# Patient Record
Sex: MALE | Race: WHITE | NOT HISPANIC OR LATINO | Employment: OTHER | URBAN - METROPOLITAN AREA
[De-identification: names, ages, dates, MRNs, and addresses within clinical notes are randomized per-mention and may not be internally consistent; named-entity substitution may affect disease eponyms.]

---

## 2023-03-24 DIAGNOSIS — E11.9 TYPE 2 DIABETES MELLITUS WITHOUT COMPLICATION, WITHOUT LONG-TERM CURRENT USE OF INSULIN (MULTI): Primary | ICD-10-CM

## 2023-03-24 RX ORDER — GLIPIZIDE 10 MG/1
10 TABLET, FILM COATED, EXTENDED RELEASE ORAL DAILY
Qty: 90 TABLET | Refills: 0 | Status: SHIPPED | OUTPATIENT
Start: 2023-03-24 | End: 2023-07-19 | Stop reason: SDUPTHER

## 2023-03-24 RX ORDER — DAPAGLIFLOZIN 10 MG/1
1 TABLET, FILM COATED ORAL DAILY
COMMUNITY
Start: 2020-02-20 | End: 2023-03-24 | Stop reason: SDUPTHER

## 2023-03-24 RX ORDER — DAPAGLIFLOZIN 10 MG/1
10 TABLET, FILM COATED ORAL DAILY
Qty: 90 TABLET | Refills: 0 | Status: SHIPPED | OUTPATIENT
Start: 2023-03-24 | End: 2023-07-03 | Stop reason: SDUPTHER

## 2023-03-24 RX ORDER — PIOGLITAZONEHYDROCHLORIDE 30 MG/1
1 TABLET ORAL DAILY
COMMUNITY
Start: 2022-06-02 | End: 2023-03-24 | Stop reason: SDUPTHER

## 2023-03-24 RX ORDER — GLIPIZIDE 10 MG/1
1 TABLET, FILM COATED, EXTENDED RELEASE ORAL DAILY
COMMUNITY
Start: 2019-08-12 | End: 2023-03-24 | Stop reason: SDUPTHER

## 2023-03-24 RX ORDER — PIOGLITAZONEHYDROCHLORIDE 30 MG/1
30 TABLET ORAL DAILY
Qty: 90 TABLET | Refills: 0 | Status: SHIPPED | OUTPATIENT
Start: 2023-03-24 | End: 2023-08-03 | Stop reason: SDUPTHER

## 2023-06-06 ENCOUNTER — OFFICE VISIT (OUTPATIENT)
Dept: PRIMARY CARE | Facility: CLINIC | Age: 68
End: 2023-06-06
Payer: MEDICARE

## 2023-06-06 VITALS
HEIGHT: 69 IN | DIASTOLIC BLOOD PRESSURE: 70 MMHG | SYSTOLIC BLOOD PRESSURE: 120 MMHG | WEIGHT: 167 LBS | HEART RATE: 84 BPM | BODY MASS INDEX: 24.73 KG/M2

## 2023-06-06 DIAGNOSIS — E78.01 ESSENTIAL FAMILIAL HYPERCHOLESTEROLEMIA: ICD-10-CM

## 2023-06-06 DIAGNOSIS — I10 BENIGN ESSENTIAL HYPERTENSION: Primary | ICD-10-CM

## 2023-06-06 DIAGNOSIS — R73.9 HYPERGLYCEMIA: ICD-10-CM

## 2023-06-06 DIAGNOSIS — E11.9 DIABETES MELLITUS WITH NO COMPLICATION (MULTI): ICD-10-CM

## 2023-06-06 DIAGNOSIS — K21.9 GASTROESOPHAGEAL REFLUX DISEASE WITHOUT ESOPHAGITIS: ICD-10-CM

## 2023-06-06 PROBLEM — E55.9 VITAMIN D DEFICIENCY: Status: ACTIVE | Noted: 2023-06-06

## 2023-06-06 PROBLEM — M1A.9XX0 CHRONIC GOUT: Status: ACTIVE | Noted: 2023-06-06

## 2023-06-06 LAB
ALANINE AMINOTRANSFERASE (SGPT) (U/L) IN SER/PLAS: 26 U/L (ref 10–52)
ALBUMIN (G/DL) IN SER/PLAS: 4.4 G/DL (ref 3.4–5)
ALKALINE PHOSPHATASE (U/L) IN SER/PLAS: 81 U/L (ref 33–136)
ANION GAP IN SER/PLAS: 15 MMOL/L (ref 10–20)
ASPARTATE AMINOTRANSFERASE (SGOT) (U/L) IN SER/PLAS: 21 U/L (ref 9–39)
BILIRUBIN TOTAL (MG/DL) IN SER/PLAS: 0.6 MG/DL (ref 0–1.2)
CALCIUM (MG/DL) IN SER/PLAS: 9.6 MG/DL (ref 8.6–10.6)
CARBON DIOXIDE, TOTAL (MMOL/L) IN SER/PLAS: 27 MMOL/L (ref 21–32)
CHLORIDE (MMOL/L) IN SER/PLAS: 104 MMOL/L (ref 98–107)
CHOLESTEROL (MG/DL) IN SER/PLAS: 161 MG/DL (ref 0–199)
CHOLESTEROL IN HDL (MG/DL) IN SER/PLAS: 40 MG/DL
CHOLESTEROL/HDL RATIO: 4
CREATININE (MG/DL) IN SER/PLAS: 1.13 MG/DL (ref 0.5–1.3)
GFR MALE: 71 ML/MIN/1.73M2
GLUCOSE (MG/DL) IN SER/PLAS: 145 MG/DL (ref 74–99)
LDL: 70 MG/DL (ref 0–99)
NON HDL CHOLESTEROL: 121 MG/DL
POC HEMOGLOBIN A1C: 6 % (ref 4.2–6.5)
POTASSIUM (MMOL/L) IN SER/PLAS: 3.9 MMOL/L (ref 3.5–5.3)
PROTEIN TOTAL: 7.2 G/DL (ref 6.4–8.2)
SODIUM (MMOL/L) IN SER/PLAS: 142 MMOL/L (ref 136–145)
TRIGLYCERIDE (MG/DL) IN SER/PLAS: 254 MG/DL (ref 0–149)
UREA NITROGEN (MG/DL) IN SER/PLAS: 22 MG/DL (ref 6–23)
VLDL: 51 MG/DL (ref 0–40)

## 2023-06-06 PROCEDURE — 83036 HEMOGLOBIN GLYCOSYLATED A1C: CPT | Performed by: FAMILY MEDICINE

## 2023-06-06 PROCEDURE — 3078F DIAST BP <80 MM HG: CPT | Performed by: FAMILY MEDICINE

## 2023-06-06 PROCEDURE — 80053 COMPREHEN METABOLIC PANEL: CPT

## 2023-06-06 PROCEDURE — 1159F MED LIST DOCD IN RCRD: CPT | Performed by: FAMILY MEDICINE

## 2023-06-06 PROCEDURE — 99214 OFFICE O/P EST MOD 30 MIN: CPT | Performed by: FAMILY MEDICINE

## 2023-06-06 PROCEDURE — 1036F TOBACCO NON-USER: CPT | Performed by: FAMILY MEDICINE

## 2023-06-06 PROCEDURE — 3074F SYST BP LT 130 MM HG: CPT | Performed by: FAMILY MEDICINE

## 2023-06-06 PROCEDURE — 1160F RVW MEDS BY RX/DR IN RCRD: CPT | Performed by: FAMILY MEDICINE

## 2023-06-06 PROCEDURE — 80061 LIPID PANEL: CPT

## 2023-06-06 RX ORDER — OMEGA-3-ACID ETHYL ESTERS 1 G/1
CAPSULE, LIQUID FILLED ORAL
COMMUNITY

## 2023-06-06 RX ORDER — LANCETS
EACH MISCELLANEOUS
COMMUNITY

## 2023-06-06 RX ORDER — ROSUVASTATIN CALCIUM 20 MG/1
TABLET, COATED ORAL
COMMUNITY
End: 2023-09-20 | Stop reason: SDUPTHER

## 2023-06-06 RX ORDER — ALLOPURINOL 300 MG/1
TABLET ORAL
COMMUNITY
End: 2024-05-21 | Stop reason: SDUPTHER

## 2023-06-06 RX ORDER — BLOOD-GLUCOSE METER
EACH MISCELLANEOUS
COMMUNITY

## 2023-06-06 RX ORDER — AMLODIPINE AND BENAZEPRIL HYDROCHLORIDE 5; 20 MG/1; MG/1
CAPSULE ORAL
COMMUNITY
End: 2023-08-03 | Stop reason: SDUPTHER

## 2023-06-06 RX ORDER — BLOOD SUGAR DIAGNOSTIC
STRIP MISCELLANEOUS
COMMUNITY

## 2023-06-06 RX ORDER — HYDROCHLOROTHIAZIDE 25 MG/1
TABLET ORAL
COMMUNITY
End: 2023-07-19 | Stop reason: SDUPTHER

## 2023-06-06 ASSESSMENT — ENCOUNTER SYMPTOMS
MUSCULOSKELETAL NEGATIVE: 1
GASTROINTESTINAL NEGATIVE: 1
RESPIRATORY NEGATIVE: 1
NEUROLOGICAL NEGATIVE: 1
CONSTITUTIONAL NEGATIVE: 1
CARDIOVASCULAR NEGATIVE: 1

## 2023-06-06 NOTE — PROGRESS NOTES
"Subjective   Patient ID: Denis Gonda is a 67 y.o. male who presents for No chief complaint on file..    HPI htn stable ccc and fu, chol, dm     Review of Systems   Constitutional: Negative.    HENT: Negative.     Respiratory: Negative.     Cardiovascular: Negative.    Gastrointestinal: Negative.    Musculoskeletal: Negative.    Neurological: Negative.        Objective   /70   Pulse 84   Ht 1.74 m (5' 8.5\")   Wt 75.8 kg (167 lb)   BMI 25.02 kg/m²     Physical Exam  Vitals reviewed.   Constitutional:       Appearance: Normal appearance. He is normal weight.   Eyes:      Extraocular Movements: Extraocular movements intact.      Conjunctiva/sclera: Conjunctivae normal.      Pupils: Pupils are equal, round, and reactive to light.   Cardiovascular:      Rate and Rhythm: Normal rate and regular rhythm.      Pulses: Normal pulses.      Heart sounds: Normal heart sounds.   Pulmonary:      Effort: Pulmonary effort is normal.      Breath sounds: Normal breath sounds.   Abdominal:      General: Bowel sounds are normal.      Palpations: Abdomen is soft.   Musculoskeletal:         General: Normal range of motion.   Skin:     General: Skin is warm and dry.   Neurological:      General: No focal deficit present.      Mental Status: He is alert and oriented to person, place, and time. Mental status is at baseline.         Assessment/Plan   Problem List Items Addressed This Visit    None  Visit Diagnoses       Diabetes mellitus with no complication (CMS/HCC)        Relevant Orders    POCT glycosylated hemoglobin (Hb A1C) manually resulted               "

## 2023-06-08 ENCOUNTER — TELEPHONE (OUTPATIENT)
Dept: PRIMARY CARE | Facility: CLINIC | Age: 68
End: 2023-06-08
Payer: MEDICARE

## 2023-07-03 DIAGNOSIS — E11.9 TYPE 2 DIABETES MELLITUS WITHOUT COMPLICATION, WITHOUT LONG-TERM CURRENT USE OF INSULIN (MULTI): ICD-10-CM

## 2023-07-03 RX ORDER — DAPAGLIFLOZIN 10 MG/1
10 TABLET, FILM COATED ORAL DAILY
Qty: 90 TABLET | Refills: 0 | Status: SHIPPED | OUTPATIENT
Start: 2023-07-03 | End: 2023-10-16 | Stop reason: SDUPTHER

## 2023-07-19 DIAGNOSIS — E11.9 TYPE 2 DIABETES MELLITUS WITHOUT COMPLICATION, WITHOUT LONG-TERM CURRENT USE OF INSULIN (MULTI): ICD-10-CM

## 2023-07-19 DIAGNOSIS — I10 BENIGN ESSENTIAL HYPERTENSION: Primary | ICD-10-CM

## 2023-07-20 RX ORDER — GLIPIZIDE 10 MG/1
10 TABLET, FILM COATED, EXTENDED RELEASE ORAL DAILY
Qty: 90 TABLET | Refills: 0 | Status: SHIPPED | OUTPATIENT
Start: 2023-07-20 | End: 2023-11-15 | Stop reason: SDUPTHER

## 2023-07-20 RX ORDER — HYDROCHLOROTHIAZIDE 25 MG/1
25 TABLET ORAL DAILY
Qty: 90 TABLET | Refills: 0 | Status: SHIPPED | OUTPATIENT
Start: 2023-07-20 | End: 2024-01-31 | Stop reason: SDUPTHER

## 2023-08-03 DIAGNOSIS — E11.9 TYPE 2 DIABETES MELLITUS WITHOUT COMPLICATION, WITHOUT LONG-TERM CURRENT USE OF INSULIN (MULTI): ICD-10-CM

## 2023-08-03 DIAGNOSIS — I10 BENIGN ESSENTIAL HYPERTENSION: Primary | ICD-10-CM

## 2023-08-04 RX ORDER — AMLODIPINE AND BENAZEPRIL HYDROCHLORIDE 5; 20 MG/1; MG/1
1 CAPSULE ORAL DAILY
Qty: 90 CAPSULE | Refills: 0 | Status: SHIPPED | OUTPATIENT
Start: 2023-08-04 | End: 2023-11-13 | Stop reason: SDUPTHER

## 2023-08-04 RX ORDER — PIOGLITAZONEHYDROCHLORIDE 30 MG/1
30 TABLET ORAL DAILY
Qty: 90 TABLET | Refills: 0 | Status: SHIPPED | OUTPATIENT
Start: 2023-08-04 | End: 2023-09-20 | Stop reason: SDUPTHER

## 2023-09-20 DIAGNOSIS — E78.01 ESSENTIAL FAMILIAL HYPERCHOLESTEROLEMIA: Primary | ICD-10-CM

## 2023-09-20 DIAGNOSIS — E11.9 TYPE 2 DIABETES MELLITUS WITHOUT COMPLICATION, WITHOUT LONG-TERM CURRENT USE OF INSULIN (MULTI): ICD-10-CM

## 2023-09-20 RX ORDER — ROSUVASTATIN CALCIUM 20 MG/1
20 TABLET, COATED ORAL DAILY
Qty: 90 TABLET | Refills: 0 | Status: SHIPPED | OUTPATIENT
Start: 2023-09-20 | End: 2024-01-31 | Stop reason: SDUPTHER

## 2023-09-20 RX ORDER — PIOGLITAZONEHYDROCHLORIDE 30 MG/1
30 TABLET ORAL DAILY
Qty: 90 TABLET | Refills: 0 | Status: SHIPPED | OUTPATIENT
Start: 2023-09-20 | End: 2024-01-24 | Stop reason: SDUPTHER

## 2023-10-16 DIAGNOSIS — E11.9 TYPE 2 DIABETES MELLITUS WITHOUT COMPLICATION, WITHOUT LONG-TERM CURRENT USE OF INSULIN (MULTI): ICD-10-CM

## 2023-10-16 RX ORDER — DAPAGLIFLOZIN 10 MG/1
10 TABLET, FILM COATED ORAL DAILY
Qty: 90 TABLET | Refills: 0 | Status: SHIPPED | OUTPATIENT
Start: 2023-10-16 | End: 2023-10-18 | Stop reason: SDUPTHER

## 2023-10-18 DIAGNOSIS — E11.9 TYPE 2 DIABETES MELLITUS WITHOUT COMPLICATION, WITHOUT LONG-TERM CURRENT USE OF INSULIN (MULTI): ICD-10-CM

## 2023-10-18 RX ORDER — DAPAGLIFLOZIN 10 MG/1
10 TABLET, FILM COATED ORAL DAILY
Qty: 90 TABLET | Refills: 0 | Status: SHIPPED | OUTPATIENT
Start: 2023-10-18 | End: 2023-10-24 | Stop reason: SDUPTHER

## 2023-10-24 ENCOUNTER — TELEPHONE (OUTPATIENT)
Dept: PRIMARY CARE | Facility: CLINIC | Age: 68
End: 2023-10-24
Payer: MEDICARE

## 2023-10-24 DIAGNOSIS — E11.9 TYPE 2 DIABETES MELLITUS WITHOUT COMPLICATION, WITHOUT LONG-TERM CURRENT USE OF INSULIN (MULTI): ICD-10-CM

## 2023-10-24 RX ORDER — DAPAGLIFLOZIN 10 MG/1
10 TABLET, FILM COATED ORAL DAILY
Qty: 90 TABLET | Refills: 0 | Status: SHIPPED | OUTPATIENT
Start: 2023-10-24 | End: 2024-01-24 | Stop reason: SDUPTHER

## 2023-10-24 NOTE — TELEPHONE ENCOUNTER
approved   Initiate Treatment: Recommend Elta MD sunscreen or any broad spectrum SPF +35 daily. Recommend reapplication with sun exposure exceeding 2 hours. Detail Level: Zone

## 2023-11-13 DIAGNOSIS — I10 BENIGN ESSENTIAL HYPERTENSION: ICD-10-CM

## 2023-11-14 RX ORDER — AMLODIPINE AND BENAZEPRIL HYDROCHLORIDE 5; 20 MG/1; MG/1
1 CAPSULE ORAL DAILY
Qty: 90 CAPSULE | Refills: 0 | Status: SHIPPED | OUTPATIENT
Start: 2023-11-14 | End: 2024-03-26 | Stop reason: SDUPTHER

## 2023-11-15 DIAGNOSIS — E11.9 TYPE 2 DIABETES MELLITUS WITHOUT COMPLICATION, WITHOUT LONG-TERM CURRENT USE OF INSULIN (MULTI): ICD-10-CM

## 2023-11-16 RX ORDER — GLIPIZIDE 10 MG/1
10 TABLET, FILM COATED, EXTENDED RELEASE ORAL DAILY
Qty: 90 TABLET | Refills: 1 | Status: SHIPPED | OUTPATIENT
Start: 2023-11-16 | End: 2024-01-24 | Stop reason: SDUPTHER

## 2023-12-07 ENCOUNTER — OFFICE VISIT (OUTPATIENT)
Dept: PRIMARY CARE | Facility: CLINIC | Age: 68
End: 2023-12-07
Payer: MEDICARE

## 2023-12-07 VITALS
DIASTOLIC BLOOD PRESSURE: 79 MMHG | WEIGHT: 166 LBS | SYSTOLIC BLOOD PRESSURE: 120 MMHG | HEIGHT: 69 IN | HEART RATE: 89 BPM | BODY MASS INDEX: 24.59 KG/M2

## 2023-12-07 DIAGNOSIS — E78.01 ESSENTIAL FAMILIAL HYPERCHOLESTEROLEMIA: Primary | ICD-10-CM

## 2023-12-07 DIAGNOSIS — R35.0 BENIGN PROSTATIC HYPERPLASIA WITH URINARY FREQUENCY: ICD-10-CM

## 2023-12-07 DIAGNOSIS — N40.1 BENIGN PROSTATIC HYPERPLASIA WITH URINARY FREQUENCY: ICD-10-CM

## 2023-12-07 DIAGNOSIS — E11.9 DIABETES MELLITUS WITH NO COMPLICATION (MULTI): ICD-10-CM

## 2023-12-07 DIAGNOSIS — M1A.9XX0 CHRONIC GOUT WITHOUT TOPHUS, UNSPECIFIED CAUSE, UNSPECIFIED SITE: ICD-10-CM

## 2023-12-07 LAB
ALBUMIN SERPL BCP-MCNC: 4.7 G/DL (ref 3.4–5)
ALP SERPL-CCNC: 79 U/L (ref 33–136)
ALT SERPL W P-5'-P-CCNC: 29 U/L (ref 10–52)
ANION GAP SERPL CALC-SCNC: 16 MMOL/L (ref 10–20)
AST SERPL W P-5'-P-CCNC: 22 U/L (ref 9–39)
BILIRUB SERPL-MCNC: 0.7 MG/DL (ref 0–1.2)
BUN SERPL-MCNC: 27 MG/DL (ref 6–23)
CALCIUM SERPL-MCNC: 10 MG/DL (ref 8.6–10.6)
CHLORIDE SERPL-SCNC: 97 MMOL/L (ref 98–107)
CHOLEST SERPL-MCNC: 161 MG/DL (ref 0–199)
CHOLESTEROL/HDL RATIO: 3.6
CO2 SERPL-SCNC: 28 MMOL/L (ref 21–32)
CREAT SERPL-MCNC: 1.3 MG/DL (ref 0.5–1.3)
GFR SERPL CREATININE-BSD FRML MDRD: 60 ML/MIN/1.73M*2
GLUCOSE SERPL-MCNC: 248 MG/DL (ref 74–99)
HDLC SERPL-MCNC: 44.5 MG/DL
LDLC SERPL CALC-MCNC: 69 MG/DL
NON HDL CHOLESTEROL: 117 MG/DL (ref 0–149)
POC HEMOGLOBIN A1C: 7.7 % (ref 4.2–6.5)
POTASSIUM SERPL-SCNC: 3.9 MMOL/L (ref 3.5–5.3)
PROT SERPL-MCNC: 7.4 G/DL (ref 6.4–8.2)
PSA SERPL-MCNC: 3.03 NG/ML
SODIUM SERPL-SCNC: 137 MMOL/L (ref 136–145)
TRIGL SERPL-MCNC: 240 MG/DL (ref 0–149)
URATE SERPL-MCNC: 6.5 MG/DL (ref 4–7.5)
VLDL: 48 MG/DL (ref 0–40)

## 2023-12-07 PROCEDURE — 1036F TOBACCO NON-USER: CPT | Performed by: FAMILY MEDICINE

## 2023-12-07 PROCEDURE — 80053 COMPREHEN METABOLIC PANEL: CPT

## 2023-12-07 PROCEDURE — 3074F SYST BP LT 130 MM HG: CPT | Performed by: FAMILY MEDICINE

## 2023-12-07 PROCEDURE — 99214 OFFICE O/P EST MOD 30 MIN: CPT | Performed by: FAMILY MEDICINE

## 2023-12-07 PROCEDURE — 36415 COLL VENOUS BLD VENIPUNCTURE: CPT

## 2023-12-07 PROCEDURE — 3078F DIAST BP <80 MM HG: CPT | Performed by: FAMILY MEDICINE

## 2023-12-07 PROCEDURE — 1160F RVW MEDS BY RX/DR IN RCRD: CPT | Performed by: FAMILY MEDICINE

## 2023-12-07 PROCEDURE — 1159F MED LIST DOCD IN RCRD: CPT | Performed by: FAMILY MEDICINE

## 2023-12-07 PROCEDURE — G0439 PPPS, SUBSEQ VISIT: HCPCS | Performed by: FAMILY MEDICINE

## 2023-12-07 PROCEDURE — 83036 HEMOGLOBIN GLYCOSYLATED A1C: CPT | Performed by: FAMILY MEDICINE

## 2023-12-07 PROCEDURE — 84550 ASSAY OF BLOOD/URIC ACID: CPT

## 2023-12-07 PROCEDURE — 84153 ASSAY OF PSA TOTAL: CPT

## 2023-12-07 PROCEDURE — 80061 LIPID PANEL: CPT

## 2023-12-07 PROCEDURE — 1170F FXNL STATUS ASSESSED: CPT | Performed by: FAMILY MEDICINE

## 2023-12-07 ASSESSMENT — PATIENT HEALTH QUESTIONNAIRE - PHQ9
1. LITTLE INTEREST OR PLEASURE IN DOING THINGS: NOT AT ALL
SUM OF ALL RESPONSES TO PHQ9 QUESTIONS 1 AND 2: 0
2. FEELING DOWN, DEPRESSED OR HOPELESS: NOT AT ALL
2. FEELING DOWN, DEPRESSED OR HOPELESS: NOT AT ALL
SUM OF ALL RESPONSES TO PHQ9 QUESTIONS 1 AND 2: 0
1. LITTLE INTEREST OR PLEASURE IN DOING THINGS: NOT AT ALL

## 2023-12-07 ASSESSMENT — ENCOUNTER SYMPTOMS
GASTROINTESTINAL NEGATIVE: 1
NEUROLOGICAL NEGATIVE: 1
LOSS OF SENSATION IN FEET: 0
MUSCULOSKELETAL NEGATIVE: 1
DEPRESSION: 0
RESPIRATORY NEGATIVE: 1
CONSTITUTIONAL NEGATIVE: 1
CARDIOVASCULAR NEGATIVE: 1
OCCASIONAL FEELINGS OF UNSTEADINESS: 0

## 2023-12-07 ASSESSMENT — ACTIVITIES OF DAILY LIVING (ADL)
MANAGING_FINANCES: INDEPENDENT
TAKING_MEDICATION: INDEPENDENT
DOING_HOUSEWORK: INDEPENDENT
DRESSING: INDEPENDENT
GROCERY_SHOPPING: INDEPENDENT
BATHING: INDEPENDENT

## 2023-12-07 NOTE — PROGRESS NOTES
Subjective   Reason for Visit: Denis Gonda is an 68 y.o. male here for a Medicare Wellness visit.      Pt comes in for wellness exam. Pt has no concerns today.          HPI    Patient Care Team:  Bandar Mandel MD as PCP - General (Family Medicine)  Bandar Mandel MD as PCP - [facility] Attributed Provider     Review of Systems    Objective   Vitals:  There were no vitals taken for this visit.      Physical Exam    Assessment/Plan   Problem List Items Addressed This Visit    None

## 2023-12-07 NOTE — PROGRESS NOTES
"Subjective   Patient ID: Denis Gonda is a 68 y.o. male who presents for Medicare Annual Wellness Visit Subsequent.    HPI well check , dm , gout, chol,     Review of Systems   Constitutional: Negative.    HENT: Negative.     Respiratory: Negative.     Cardiovascular: Negative.    Gastrointestinal: Negative.    Musculoskeletal: Negative.    Neurological: Negative.        Objective   /79   Pulse 89   Ht 1.753 m (5' 9\")   Wt 75.3 kg (166 lb)   BMI 24.51 kg/m²     Physical Exam  Vitals reviewed.   Constitutional:       Appearance: Normal appearance. He is normal weight.   Eyes:      Extraocular Movements: Extraocular movements intact.      Conjunctiva/sclera: Conjunctivae normal.      Pupils: Pupils are equal, round, and reactive to light.   Cardiovascular:      Rate and Rhythm: Normal rate and regular rhythm.      Pulses: Normal pulses.      Heart sounds: Normal heart sounds.   Pulmonary:      Effort: Pulmonary effort is normal.      Breath sounds: Normal breath sounds.   Abdominal:      General: Bowel sounds are normal.      Palpations: Abdomen is soft.   Musculoskeletal:         General: Normal range of motion.   Skin:     General: Skin is warm and dry.   Neurological:      General: No focal deficit present.      Mental Status: He is alert and oriented to person, place, and time. Mental status is at baseline.       Assessment/Plan   Problem List Items Addressed This Visit             ICD-10-CM    Chronic gout M1A.9XX0    Diabetes mellitus with no complication (CMS/[facility]) E11.9    Relevant Orders    POCT glycosylated hemoglobin (Hb A1C) manually resulted    Essential familial hypercholesterolemia - Primary E78.01     Other Visit Diagnoses         Codes    Benign prostatic hyperplasia with urinary frequency     N40.1, R35.0        Dm will reduce carbs as levels is 7.7 and try dietary adjustmante and fu       "

## 2023-12-08 ENCOUNTER — TELEPHONE (OUTPATIENT)
Dept: PRIMARY CARE | Facility: CLINIC | Age: 68
End: 2023-12-08
Payer: MEDICARE

## 2023-12-08 NOTE — TELEPHONE ENCOUNTER
----- Message from Bandar Mandel MD sent at 12/8/2023  7:50 AM EST -----  Sugar elevated as we discussed all other bw is normal

## 2023-12-08 NOTE — TELEPHONE ENCOUNTER
Spoke to patient     ----- Message from Bandar Mandel MD sent at 12/8/2023  7:50 AM EST -----  Sugar elevated as we discussed all other bw is normal

## 2023-12-08 NOTE — TELEPHONE ENCOUNTER
Lm for patient to call back    ----- Message from Bandar Mandel MD sent at 12/8/2023  7:50 AM EST -----  Sugar elevated as we discussed all other bw is normal

## 2024-01-24 DIAGNOSIS — E11.9 TYPE 2 DIABETES MELLITUS WITHOUT COMPLICATION, WITHOUT LONG-TERM CURRENT USE OF INSULIN (MULTI): ICD-10-CM

## 2024-01-24 RX ORDER — PIOGLITAZONEHYDROCHLORIDE 30 MG/1
30 TABLET ORAL DAILY
Qty: 90 TABLET | Refills: 1 | Status: SHIPPED | OUTPATIENT
Start: 2024-01-24 | End: 2024-07-22

## 2024-01-24 RX ORDER — DAPAGLIFLOZIN 10 MG/1
10 TABLET, FILM COATED ORAL DAILY
Qty: 90 TABLET | Refills: 1 | Status: SHIPPED | OUTPATIENT
Start: 2024-01-24 | End: 2024-02-07 | Stop reason: SDUPTHER

## 2024-01-24 RX ORDER — GLIPIZIDE 10 MG/1
10 TABLET, FILM COATED, EXTENDED RELEASE ORAL DAILY
Qty: 90 TABLET | Refills: 1 | Status: SHIPPED | OUTPATIENT
Start: 2024-01-24 | End: 2024-03-26 | Stop reason: SDUPTHER

## 2024-01-31 DIAGNOSIS — I10 BENIGN ESSENTIAL HYPERTENSION: ICD-10-CM

## 2024-01-31 DIAGNOSIS — E78.01 ESSENTIAL FAMILIAL HYPERCHOLESTEROLEMIA: ICD-10-CM

## 2024-01-31 RX ORDER — ROSUVASTATIN CALCIUM 20 MG/1
20 TABLET, COATED ORAL DAILY
Qty: 90 TABLET | Refills: 1 | Status: SHIPPED | OUTPATIENT
Start: 2024-01-31

## 2024-01-31 RX ORDER — HYDROCHLOROTHIAZIDE 25 MG/1
25 TABLET ORAL DAILY
Qty: 90 TABLET | Refills: 1 | Status: SHIPPED | OUTPATIENT
Start: 2024-01-31 | End: 2024-05-21 | Stop reason: SDUPTHER

## 2024-02-07 ENCOUNTER — TELEPHONE (OUTPATIENT)
Dept: PRIMARY CARE | Facility: CLINIC | Age: 69
End: 2024-02-07
Payer: MEDICARE

## 2024-02-07 DIAGNOSIS — E11.9 TYPE 2 DIABETES MELLITUS WITHOUT COMPLICATION, WITHOUT LONG-TERM CURRENT USE OF INSULIN (MULTI): ICD-10-CM

## 2024-02-07 RX ORDER — DAPAGLIFLOZIN 10 MG/1
10 TABLET, FILM COATED ORAL DAILY
Qty: 30 TABLET | Refills: 6 | Status: SHIPPED | OUTPATIENT
Start: 2024-02-07 | End: 2024-02-28 | Stop reason: SDUPTHER

## 2024-02-07 NOTE — TELEPHONE ENCOUNTER
I received a letter stating pt can not get 90 pills for 90 days of farxiga plan only allows 30 pills per 30 days at once - can you please let pt know and resent rx over to [facility] thanks

## 2024-02-28 DIAGNOSIS — E11.9 TYPE 2 DIABETES MELLITUS WITHOUT COMPLICATION, WITHOUT LONG-TERM CURRENT USE OF INSULIN (MULTI): ICD-10-CM

## 2024-02-28 RX ORDER — DAPAGLIFLOZIN 10 MG/1
10 TABLET, FILM COATED ORAL DAILY
Qty: 30 TABLET | Refills: 6 | Status: SHIPPED | OUTPATIENT
Start: 2024-02-28 | End: 2024-03-26 | Stop reason: SDUPTHER

## 2024-03-26 ENCOUNTER — APPOINTMENT (OUTPATIENT)
Dept: PRIMARY CARE | Facility: CLINIC | Age: 69
End: 2024-03-26
Payer: MEDICARE

## 2024-03-26 DIAGNOSIS — I10 BENIGN ESSENTIAL HYPERTENSION: ICD-10-CM

## 2024-03-26 DIAGNOSIS — E11.9 TYPE 2 DIABETES MELLITUS WITHOUT COMPLICATION, WITHOUT LONG-TERM CURRENT USE OF INSULIN (MULTI): ICD-10-CM

## 2024-03-26 RX ORDER — DAPAGLIFLOZIN 10 MG/1
10 TABLET, FILM COATED ORAL DAILY
Qty: 30 TABLET | Refills: 6 | Status: SHIPPED | OUTPATIENT
Start: 2024-03-26 | End: 2024-04-30 | Stop reason: SDUPTHER

## 2024-03-26 RX ORDER — AMLODIPINE AND BENAZEPRIL HYDROCHLORIDE 5; 20 MG/1; MG/1
1 CAPSULE ORAL DAILY
Qty: 90 CAPSULE | Refills: 1 | Status: SHIPPED | OUTPATIENT
Start: 2024-03-26 | End: 2024-09-22

## 2024-03-26 RX ORDER — GLIPIZIDE 10 MG/1
10 TABLET, FILM COATED, EXTENDED RELEASE ORAL DAILY
Qty: 90 TABLET | Refills: 1 | Status: SHIPPED | OUTPATIENT
Start: 2024-03-26 | End: 2024-09-22

## 2024-04-30 DIAGNOSIS — E11.9 TYPE 2 DIABETES MELLITUS WITHOUT COMPLICATION, WITHOUT LONG-TERM CURRENT USE OF INSULIN (MULTI): ICD-10-CM

## 2024-04-30 RX ORDER — DAPAGLIFLOZIN 10 MG/1
10 TABLET, FILM COATED ORAL DAILY
Qty: 90 TABLET | Refills: 0 | Status: SHIPPED | OUTPATIENT
Start: 2024-04-30 | End: 2024-05-30 | Stop reason: SDUPTHER

## 2024-04-30 RX ORDER — DAPAGLIFLOZIN 10 MG/1
10 TABLET, FILM COATED ORAL DAILY
Qty: 30 TABLET | Refills: 6 | Status: SHIPPED | OUTPATIENT
Start: 2024-04-30 | End: 2024-04-30 | Stop reason: SDUPTHER

## 2024-05-21 DIAGNOSIS — I10 BENIGN ESSENTIAL HYPERTENSION: ICD-10-CM

## 2024-05-21 DIAGNOSIS — M1A.9XX0 CHRONIC GOUT WITHOUT TOPHUS, UNSPECIFIED CAUSE, UNSPECIFIED SITE: Primary | ICD-10-CM

## 2024-05-21 RX ORDER — HYDROCHLOROTHIAZIDE 25 MG/1
25 TABLET ORAL DAILY
Qty: 90 TABLET | Refills: 0 | Status: SHIPPED | OUTPATIENT
Start: 2024-05-21 | End: 2024-11-17

## 2024-05-21 RX ORDER — ALLOPURINOL 300 MG/1
300 TABLET ORAL DAILY
Qty: 90 TABLET | Refills: 0 | Status: SHIPPED | OUTPATIENT
Start: 2024-05-21

## 2024-05-30 ENCOUNTER — OFFICE VISIT (OUTPATIENT)
Dept: PRIMARY CARE | Facility: CLINIC | Age: 69
End: 2024-05-30
Payer: MEDICARE

## 2024-05-30 VITALS
WEIGHT: 160 LBS | SYSTOLIC BLOOD PRESSURE: 143 MMHG | BODY MASS INDEX: 24.25 KG/M2 | DIASTOLIC BLOOD PRESSURE: 79 MMHG | HEIGHT: 68 IN | HEART RATE: 79 BPM

## 2024-05-30 DIAGNOSIS — E11.9 TYPE 2 DIABETES MELLITUS WITHOUT COMPLICATION, WITHOUT LONG-TERM CURRENT USE OF INSULIN (MULTI): ICD-10-CM

## 2024-05-30 DIAGNOSIS — K21.9 GASTROESOPHAGEAL REFLUX DISEASE WITHOUT ESOPHAGITIS: ICD-10-CM

## 2024-05-30 DIAGNOSIS — I10 BENIGN ESSENTIAL HYPERTENSION: Primary | ICD-10-CM

## 2024-05-30 DIAGNOSIS — E78.01 ESSENTIAL FAMILIAL HYPERCHOLESTEROLEMIA: ICD-10-CM

## 2024-05-30 DIAGNOSIS — E11.9 DIABETES MELLITUS WITH NO COMPLICATION (MULTI): ICD-10-CM

## 2024-05-30 PROBLEM — H04.129 DRY EYE SYNDROME: Status: ACTIVE | Noted: 2023-03-31

## 2024-05-30 PROBLEM — H52.7 REFRACTION ERROR: Status: ACTIVE | Noted: 2024-04-03

## 2024-05-30 PROBLEM — H25.9 AGE-RELATED CATARACT: Status: ACTIVE | Noted: 2023-03-31

## 2024-05-30 LAB
ALBUMIN SERPL BCP-MCNC: 4.7 G/DL (ref 3.4–5)
ALP SERPL-CCNC: 71 U/L (ref 33–136)
ALT SERPL W P-5'-P-CCNC: 34 U/L (ref 10–52)
ANION GAP SERPL CALC-SCNC: 14 MMOL/L (ref 10–20)
AST SERPL W P-5'-P-CCNC: 21 U/L (ref 9–39)
BILIRUB SERPL-MCNC: 0.5 MG/DL (ref 0–1.2)
BUN SERPL-MCNC: 31 MG/DL (ref 6–23)
CALCIUM SERPL-MCNC: 9.9 MG/DL (ref 8.6–10.6)
CHLORIDE SERPL-SCNC: 105 MMOL/L (ref 98–107)
CHOLEST SERPL-MCNC: 156 MG/DL (ref 0–199)
CHOLESTEROL/HDL RATIO: 3.1
CO2 SERPL-SCNC: 27 MMOL/L (ref 21–32)
CREAT SERPL-MCNC: 1.12 MG/DL (ref 0.5–1.3)
EGFRCR SERPLBLD CKD-EPI 2021: 72 ML/MIN/1.73M*2
GLUCOSE SERPL-MCNC: 85 MG/DL (ref 74–99)
HDLC SERPL-MCNC: 50.8 MG/DL
LDLC SERPL CALC-MCNC: 75 MG/DL
NON HDL CHOLESTEROL: 105 MG/DL (ref 0–149)
POC HEMOGLOBIN A1C: 6.3 % (ref 4.2–6.5)
POTASSIUM SERPL-SCNC: 4.1 MMOL/L (ref 3.5–5.3)
PROT SERPL-MCNC: 7.2 G/DL (ref 6.4–8.2)
SODIUM SERPL-SCNC: 142 MMOL/L (ref 136–145)
TRIGL SERPL-MCNC: 152 MG/DL (ref 0–149)
VLDL: 30 MG/DL (ref 0–40)

## 2024-05-30 PROCEDURE — 1158F ADVNC CARE PLAN TLK DOCD: CPT | Performed by: FAMILY MEDICINE

## 2024-05-30 PROCEDURE — 1123F ACP DISCUSS/DSCN MKR DOCD: CPT | Performed by: FAMILY MEDICINE

## 2024-05-30 PROCEDURE — 1159F MED LIST DOCD IN RCRD: CPT | Performed by: FAMILY MEDICINE

## 2024-05-30 PROCEDURE — 3077F SYST BP >= 140 MM HG: CPT | Performed by: FAMILY MEDICINE

## 2024-05-30 PROCEDURE — 1036F TOBACCO NON-USER: CPT | Performed by: FAMILY MEDICINE

## 2024-05-30 PROCEDURE — 80053 COMPREHEN METABOLIC PANEL: CPT

## 2024-05-30 PROCEDURE — 3078F DIAST BP <80 MM HG: CPT | Performed by: FAMILY MEDICINE

## 2024-05-30 PROCEDURE — 80061 LIPID PANEL: CPT

## 2024-05-30 PROCEDURE — 99214 OFFICE O/P EST MOD 30 MIN: CPT | Performed by: FAMILY MEDICINE

## 2024-05-30 PROCEDURE — 83036 HEMOGLOBIN GLYCOSYLATED A1C: CPT | Performed by: FAMILY MEDICINE

## 2024-05-30 PROCEDURE — 36415 COLL VENOUS BLD VENIPUNCTURE: CPT

## 2024-05-30 RX ORDER — DAPAGLIFLOZIN 10 MG/1
10 TABLET, FILM COATED ORAL DAILY
Qty: 90 TABLET | Refills: 1 | Status: SHIPPED | OUTPATIENT
Start: 2024-05-30 | End: 2024-11-26

## 2024-05-30 ASSESSMENT — ENCOUNTER SYMPTOMS
MUSCULOSKELETAL NEGATIVE: 1
PSYCHIATRIC NEGATIVE: 1
LOSS OF SENSATION IN FEET: 0
GASTROINTESTINAL NEGATIVE: 1
HEMATOLOGIC/LYMPHATIC NEGATIVE: 1
ALLERGIC/IMMUNOLOGIC NEGATIVE: 1
OCCASIONAL FEELINGS OF UNSTEADINESS: 0
NEUROLOGICAL NEGATIVE: 1
DEPRESSION: 0
CONSTITUTIONAL NEGATIVE: 1
RESPIRATORY NEGATIVE: 1
ENDOCRINE NEGATIVE: 1
EYES NEGATIVE: 1
CARDIOVASCULAR NEGATIVE: 1

## 2024-05-30 NOTE — PROGRESS NOTES
"Subjective   Patient ID: Denis Gonda is a 68 y.o. male who presents for No chief complaint on file..    HPI htn, chol, dm gerd    Review of Systems   Constitutional: Negative.    HENT: Negative.     Eyes: Negative.    Respiratory: Negative.     Cardiovascular: Negative.    Gastrointestinal: Negative.    Endocrine: Negative.    Musculoskeletal: Negative.    Skin: Negative.    Allergic/Immunologic: Negative.    Neurological: Negative.    Hematological: Negative.    Psychiatric/Behavioral: Negative.         Objective   /79   Pulse 79   Ht 1.727 m (5' 8\")   Wt 72.6 kg (160 lb)   BMI 24.33 kg/m²     Physical Exam  Vitals reviewed.   Constitutional:       Appearance: Normal appearance. He is normal weight.   Eyes:      Extraocular Movements: Extraocular movements intact.      Conjunctiva/sclera: Conjunctivae normal.      Pupils: Pupils are equal, round, and reactive to light.   Cardiovascular:      Rate and Rhythm: Normal rate and regular rhythm.      Pulses: Normal pulses.      Heart sounds: Normal heart sounds.   Pulmonary:      Effort: Pulmonary effort is normal.      Breath sounds: Normal breath sounds.   Abdominal:      General: Bowel sounds are normal.      Palpations: Abdomen is soft.   Musculoskeletal:         General: Normal range of motion.   Skin:     General: Skin is warm and dry.   Neurological:      General: No focal deficit present.      Mental Status: He is alert and oriented to person, place, and time. Mental status is at baseline.       Assessment/Plan   Problem List Items Addressed This Visit             ICD-10-CM    Benign essential hypertension - Primary I10    Relevant Orders    Comprehensive Metabolic Panel    Diabetes mellitus with no complication (Multi) E11.9    Esophageal reflux K21.9    Essential familial hypercholesterolemia E78.01    Relevant Orders    Lipid Panel     Other Visit Diagnoses         Codes    Type 2 diabetes mellitus without complication, without long-term current use " of insulin (Multi)     E11.9    Relevant Medications    dapagliflozin propanediol (Farxiga) 10 mg    Other Relevant Orders    POCT glycosylated hemoglobin (Hb A1C) manually resulted (Completed)

## 2024-05-31 ENCOUNTER — TELEPHONE (OUTPATIENT)
Dept: PRIMARY CARE | Facility: CLINIC | Age: 69
End: 2024-05-31
Payer: MEDICARE

## 2024-07-01 DIAGNOSIS — E78.01 ESSENTIAL FAMILIAL HYPERCHOLESTEROLEMIA: ICD-10-CM

## 2024-07-01 RX ORDER — ROSUVASTATIN CALCIUM 20 MG/1
20 TABLET, COATED ORAL DAILY
Qty: 90 TABLET | Refills: 1 | Status: SHIPPED | OUTPATIENT
Start: 2024-07-01

## 2024-08-09 DIAGNOSIS — E11.9 TYPE 2 DIABETES MELLITUS WITHOUT COMPLICATION, WITHOUT LONG-TERM CURRENT USE OF INSULIN (MULTI): ICD-10-CM

## 2024-08-09 RX ORDER — PIOGLITAZONEHYDROCHLORIDE 30 MG/1
30 TABLET ORAL DAILY
Qty: 90 TABLET | Refills: 1 | Status: SHIPPED | OUTPATIENT
Start: 2024-08-09 | End: 2025-02-05

## 2024-08-19 DIAGNOSIS — E11.9 TYPE 2 DIABETES MELLITUS WITHOUT COMPLICATION, WITHOUT LONG-TERM CURRENT USE OF INSULIN (MULTI): ICD-10-CM

## 2024-08-19 RX ORDER — PIOGLITAZONEHYDROCHLORIDE 30 MG/1
30 TABLET ORAL DAILY
Qty: 90 TABLET | Refills: 1 | Status: SHIPPED | OUTPATIENT
Start: 2024-08-19 | End: 2025-02-15

## 2024-09-30 DIAGNOSIS — E11.9 TYPE 2 DIABETES MELLITUS WITHOUT COMPLICATION, WITHOUT LONG-TERM CURRENT USE OF INSULIN (MULTI): ICD-10-CM

## 2024-09-30 DIAGNOSIS — I10 BENIGN ESSENTIAL HYPERTENSION: ICD-10-CM

## 2024-09-30 RX ORDER — AMLODIPINE AND BENAZEPRIL HYDROCHLORIDE 5; 20 MG/1; MG/1
1 CAPSULE ORAL DAILY
Qty: 90 CAPSULE | Refills: 0 | Status: SHIPPED | OUTPATIENT
Start: 2024-09-30 | End: 2025-03-29

## 2024-09-30 RX ORDER — GLIPIZIDE 10 MG/1
10 TABLET, FILM COATED, EXTENDED RELEASE ORAL DAILY
Qty: 90 TABLET | Refills: 0 | Status: SHIPPED | OUTPATIENT
Start: 2024-09-30 | End: 2025-03-29

## 2024-10-31 DIAGNOSIS — I10 BENIGN ESSENTIAL HYPERTENSION: ICD-10-CM

## 2024-10-31 DIAGNOSIS — M1A.9XX0 CHRONIC GOUT WITHOUT TOPHUS, UNSPECIFIED CAUSE, UNSPECIFIED SITE: ICD-10-CM

## 2024-10-31 RX ORDER — AMLODIPINE AND BENAZEPRIL HYDROCHLORIDE 5; 20 MG/1; MG/1
1 CAPSULE ORAL DAILY
Qty: 90 CAPSULE | Refills: 0 | Status: SHIPPED | OUTPATIENT
Start: 2024-10-31 | End: 2025-04-29

## 2024-10-31 RX ORDER — ALLOPURINOL 300 MG/1
300 TABLET ORAL DAILY
Qty: 90 TABLET | Refills: 0 | Status: SHIPPED | OUTPATIENT
Start: 2024-10-31

## 2024-11-22 DIAGNOSIS — E11.9 TYPE 2 DIABETES MELLITUS WITHOUT COMPLICATION, WITHOUT LONG-TERM CURRENT USE OF INSULIN (MULTI): ICD-10-CM

## 2024-11-22 RX ORDER — DAPAGLIFLOZIN 10 MG/1
10 TABLET, FILM COATED ORAL DAILY
Qty: 30 TABLET | Refills: 0 | Status: SHIPPED | OUTPATIENT
Start: 2024-11-22 | End: 2024-12-22

## 2024-11-22 RX ORDER — PIOGLITAZONEHYDROCHLORIDE 30 MG/1
30 TABLET ORAL DAILY
Qty: 30 TABLET | Refills: 0 | Status: SHIPPED | OUTPATIENT
Start: 2024-11-22 | End: 2024-12-22

## 2024-12-02 ENCOUNTER — APPOINTMENT (OUTPATIENT)
Dept: PRIMARY CARE | Facility: CLINIC | Age: 69
End: 2024-12-02
Payer: MEDICARE

## 2024-12-02 VITALS
SYSTOLIC BLOOD PRESSURE: 115 MMHG | WEIGHT: 165 LBS | HEART RATE: 88 BPM | HEIGHT: 68 IN | BODY MASS INDEX: 25.01 KG/M2 | DIASTOLIC BLOOD PRESSURE: 70 MMHG

## 2024-12-02 DIAGNOSIS — I10 BENIGN ESSENTIAL HYPERTENSION: ICD-10-CM

## 2024-12-02 DIAGNOSIS — N40.1 BENIGN PROSTATIC HYPERPLASIA WITH URINARY FREQUENCY: ICD-10-CM

## 2024-12-02 DIAGNOSIS — M1A.9XX0 CHRONIC GOUT WITHOUT TOPHUS, UNSPECIFIED CAUSE, UNSPECIFIED SITE: ICD-10-CM

## 2024-12-02 DIAGNOSIS — E78.01 ESSENTIAL FAMILIAL HYPERCHOLESTEROLEMIA: Primary | ICD-10-CM

## 2024-12-02 DIAGNOSIS — E11.9 DIABETES MELLITUS WITH NO COMPLICATION (MULTI): ICD-10-CM

## 2024-12-02 DIAGNOSIS — K21.9 GASTROESOPHAGEAL REFLUX DISEASE WITHOUT ESOPHAGITIS: ICD-10-CM

## 2024-12-02 DIAGNOSIS — R35.0 BENIGN PROSTATIC HYPERPLASIA WITH URINARY FREQUENCY: ICD-10-CM

## 2024-12-02 DIAGNOSIS — E11.9 TYPE 2 DIABETES MELLITUS WITHOUT COMPLICATION, WITHOUT LONG-TERM CURRENT USE OF INSULIN (MULTI): ICD-10-CM

## 2024-12-02 LAB
ALBUMIN SERPL BCP-MCNC: 4.4 G/DL (ref 3.4–5)
ALP SERPL-CCNC: 72 U/L (ref 33–136)
ALT SERPL W P-5'-P-CCNC: 37 U/L (ref 10–52)
ANION GAP SERPL CALC-SCNC: 14 MMOL/L (ref 10–20)
AST SERPL W P-5'-P-CCNC: 20 U/L (ref 9–39)
BILIRUB SERPL-MCNC: 0.5 MG/DL (ref 0–1.2)
BUN SERPL-MCNC: 24 MG/DL (ref 6–23)
CALCIUM SERPL-MCNC: 9.7 MG/DL (ref 8.6–10.6)
CHLORIDE SERPL-SCNC: 104 MMOL/L (ref 98–107)
CHOLEST SERPL-MCNC: 148 MG/DL (ref 0–199)
CHOLESTEROL/HDL RATIO: 3
CO2 SERPL-SCNC: 26 MMOL/L (ref 21–32)
CREAT SERPL-MCNC: 1.1 MG/DL (ref 0.5–1.3)
EGFRCR SERPLBLD CKD-EPI 2021: 73 ML/MIN/1.73M*2
GLUCOSE SERPL-MCNC: 133 MG/DL (ref 74–99)
HDLC SERPL-MCNC: 49.8 MG/DL
LDLC SERPL CALC-MCNC: 63 MG/DL
NON HDL CHOLESTEROL: 98 MG/DL (ref 0–149)
POC HEMOGLOBIN A1C: 6.7 % (ref 4.2–6.5)
POTASSIUM SERPL-SCNC: 3.9 MMOL/L (ref 3.5–5.3)
PROT SERPL-MCNC: 6.8 G/DL (ref 6.4–8.2)
PSA SERPL-MCNC: 3.1 NG/ML
SODIUM SERPL-SCNC: 140 MMOL/L (ref 136–145)
TRIGL SERPL-MCNC: 174 MG/DL (ref 0–149)
URATE SERPL-MCNC: 4.2 MG/DL (ref 4–7.5)
VLDL: 35 MG/DL (ref 0–40)

## 2024-12-02 PROCEDURE — 1158F ADVNC CARE PLAN TLK DOCD: CPT | Performed by: FAMILY MEDICINE

## 2024-12-02 PROCEDURE — 84153 ASSAY OF PSA TOTAL: CPT

## 2024-12-02 PROCEDURE — 1123F ACP DISCUSS/DSCN MKR DOCD: CPT | Performed by: FAMILY MEDICINE

## 2024-12-02 PROCEDURE — 3074F SYST BP LT 130 MM HG: CPT | Performed by: FAMILY MEDICINE

## 2024-12-02 PROCEDURE — 3048F LDL-C <100 MG/DL: CPT | Performed by: FAMILY MEDICINE

## 2024-12-02 PROCEDURE — 80061 LIPID PANEL: CPT

## 2024-12-02 PROCEDURE — 99214 OFFICE O/P EST MOD 30 MIN: CPT | Performed by: FAMILY MEDICINE

## 2024-12-02 PROCEDURE — 1159F MED LIST DOCD IN RCRD: CPT | Performed by: FAMILY MEDICINE

## 2024-12-02 PROCEDURE — 1036F TOBACCO NON-USER: CPT | Performed by: FAMILY MEDICINE

## 2024-12-02 PROCEDURE — 84550 ASSAY OF BLOOD/URIC ACID: CPT

## 2024-12-02 PROCEDURE — 83036 HEMOGLOBIN GLYCOSYLATED A1C: CPT | Performed by: FAMILY MEDICINE

## 2024-12-02 PROCEDURE — 3008F BODY MASS INDEX DOCD: CPT | Performed by: FAMILY MEDICINE

## 2024-12-02 PROCEDURE — G0439 PPPS, SUBSEQ VISIT: HCPCS | Performed by: FAMILY MEDICINE

## 2024-12-02 PROCEDURE — 1170F FXNL STATUS ASSESSED: CPT | Performed by: FAMILY MEDICINE

## 2024-12-02 PROCEDURE — 80053 COMPREHEN METABOLIC PANEL: CPT

## 2024-12-02 PROCEDURE — 3078F DIAST BP <80 MM HG: CPT | Performed by: FAMILY MEDICINE

## 2024-12-02 ASSESSMENT — ACTIVITIES OF DAILY LIVING (ADL)
DOING_HOUSEWORK: INDEPENDENT
MANAGING_FINANCES: INDEPENDENT
TAKING_MEDICATION: INDEPENDENT
GROCERY_SHOPPING: INDEPENDENT
DRESSING: INDEPENDENT
BATHING: INDEPENDENT

## 2024-12-02 ASSESSMENT — ENCOUNTER SYMPTOMS
DEPRESSION: 0
CARDIOVASCULAR NEGATIVE: 1
OCCASIONAL FEELINGS OF UNSTEADINESS: 0
LOSS OF SENSATION IN FEET: 0
GASTROINTESTINAL NEGATIVE: 1
RESPIRATORY NEGATIVE: 1
CONSTITUTIONAL NEGATIVE: 1
NEUROLOGICAL NEGATIVE: 1
MUSCULOSKELETAL NEGATIVE: 1

## 2024-12-02 ASSESSMENT — PATIENT HEALTH QUESTIONNAIRE - PHQ9
SUM OF ALL RESPONSES TO PHQ9 QUESTIONS 1 AND 2: 0
1. LITTLE INTEREST OR PLEASURE IN DOING THINGS: NOT AT ALL
2. FEELING DOWN, DEPRESSED OR HOPELESS: NOT AT ALL

## 2024-12-02 NOTE — PROGRESS NOTES
"Subjective   Patient ID: Denis Gonda is a 69 y.o. male who presents for Medicare Annual Wellness Visit Subsequent.    HPI dm, gerd, chol, htn ,gout    Review of Systems   Constitutional: Negative.    HENT: Negative.     Respiratory: Negative.     Cardiovascular: Negative.    Gastrointestinal: Negative.    Musculoskeletal: Negative.    Neurological: Negative.        Objective   /70   Pulse 88   Ht 1.727 m (5' 8\")   Wt 74.8 kg (165 lb)   BMI 25.09 kg/m²     Physical Exam  Vitals reviewed.   Constitutional:       Appearance: Normal appearance. He is normal weight.   Eyes:      Extraocular Movements: Extraocular movements intact.      Conjunctiva/sclera: Conjunctivae normal.      Pupils: Pupils are equal, round, and reactive to light.   Cardiovascular:      Rate and Rhythm: Normal rate and regular rhythm.      Pulses: Normal pulses.      Heart sounds: Normal heart sounds.   Pulmonary:      Effort: Pulmonary effort is normal.      Breath sounds: Normal breath sounds.   Abdominal:      General: Bowel sounds are normal.      Palpations: Abdomen is soft.   Musculoskeletal:         General: Normal range of motion.   Skin:     General: Skin is warm and dry.   Neurological:      General: No focal deficit present.      Mental Status: He is alert and oriented to person, place, and time. Mental status is at baseline.         Assessment/Plan   Problem List Items Addressed This Visit             ICD-10-CM    Benign essential hypertension I10    Relevant Orders    Comprehensive Metabolic Panel    Chronic gout M1A.9XX0    Relevant Orders    Uric Acid    Diabetes mellitus with no complication (Multi) E11.9    Esophageal reflux K21.9    Essential familial hypercholesterolemia - Primary E78.01    Relevant Orders    Lipid Panel     Other Visit Diagnoses         Codes    Type 2 diabetes mellitus without complication, without long-term current use of insulin (Multi)     E11.9    Relevant Orders    POCT glycosylated hemoglobin (Hb " A1C) manually resulted (Completed)    Benign prostatic hyperplasia with urinary frequency     N40.1, R35.0    Relevant Orders    Prostate Specific Antigen

## 2024-12-02 NOTE — PROGRESS NOTES
Subjective   Reason for Visit: Denis Gonda is an 69 y.o. male here for a Medicare Wellness visit.      Pt comes in for wellness exam. Pt has no concerns today.          HPI    Patient Care Team:  Bandar Mandel MD as PCP - General (Family Medicine)  Bandar Mandel MD as PCP - [facility] Attributed Provider     Review of Systems    Objective   Vitals:  There were no vitals taken for this visit.      Physical Exam    Assessment & Plan  Type 2 diabetes mellitus without complication, without long-term current use of insulin (Multi)    Orders:    POCT glycosylated hemoglobin (Hb A1C) manually resulted

## 2024-12-05 ENCOUNTER — TELEPHONE (OUTPATIENT)
Dept: PRIMARY CARE | Facility: CLINIC | Age: 69
End: 2024-12-05
Payer: MEDICARE

## 2024-12-05 NOTE — TELEPHONE ENCOUNTER
----- Message from Bandar Mandel sent at 12/3/2024  7:49 AM EST -----  All results are stable  no change

## 2024-12-26 DIAGNOSIS — E11.9 TYPE 2 DIABETES MELLITUS WITHOUT COMPLICATION, WITHOUT LONG-TERM CURRENT USE OF INSULIN (MULTI): ICD-10-CM

## 2024-12-26 RX ORDER — DAPAGLIFLOZIN 10 MG/1
10 TABLET, FILM COATED ORAL DAILY
Qty: 90 TABLET | Refills: 1 | Status: SHIPPED | OUTPATIENT
Start: 2024-12-26 | End: 2025-06-24

## 2025-02-13 DIAGNOSIS — E11.9 TYPE 2 DIABETES MELLITUS WITHOUT COMPLICATION, WITHOUT LONG-TERM CURRENT USE OF INSULIN (MULTI): ICD-10-CM

## 2025-02-13 RX ORDER — PIOGLITAZONEHYDROCHLORIDE 30 MG/1
30 TABLET ORAL DAILY
Qty: 90 TABLET | Refills: 1 | Status: SHIPPED | OUTPATIENT
Start: 2025-02-13 | End: 2025-08-12

## 2025-02-24 DIAGNOSIS — I10 BENIGN ESSENTIAL HYPERTENSION: ICD-10-CM

## 2025-02-24 DIAGNOSIS — M1A.9XX0 CHRONIC GOUT WITHOUT TOPHUS, UNSPECIFIED CAUSE, UNSPECIFIED SITE: ICD-10-CM

## 2025-02-24 RX ORDER — AMLODIPINE AND BENAZEPRIL HYDROCHLORIDE 5; 20 MG/1; MG/1
1 CAPSULE ORAL DAILY
Qty: 90 CAPSULE | Refills: 0 | Status: SHIPPED | OUTPATIENT
Start: 2025-02-24 | End: 2025-08-23

## 2025-02-24 RX ORDER — ALLOPURINOL 300 MG/1
300 TABLET ORAL DAILY
Qty: 90 TABLET | Refills: 0 | Status: SHIPPED | OUTPATIENT
Start: 2025-02-24

## 2025-03-17 DIAGNOSIS — I10 BENIGN ESSENTIAL HYPERTENSION: ICD-10-CM

## 2025-03-17 RX ORDER — HYDROCHLOROTHIAZIDE 25 MG/1
25 TABLET ORAL DAILY
Qty: 90 TABLET | Refills: 0 | Status: SHIPPED | OUTPATIENT
Start: 2025-03-17 | End: 2025-09-13

## 2025-04-14 DIAGNOSIS — E11.9 TYPE 2 DIABETES MELLITUS WITHOUT COMPLICATION, WITHOUT LONG-TERM CURRENT USE OF INSULIN: ICD-10-CM

## 2025-04-14 RX ORDER — DAPAGLIFLOZIN 10 MG/1
10 TABLET, FILM COATED ORAL DAILY
Qty: 90 TABLET | Refills: 0 | Status: SHIPPED | OUTPATIENT
Start: 2025-04-14 | End: 2025-10-11

## 2025-06-02 ENCOUNTER — APPOINTMENT (OUTPATIENT)
Dept: PRIMARY CARE | Facility: CLINIC | Age: 70
End: 2025-06-02
Payer: MEDICARE

## 2025-06-02 VITALS
HEART RATE: 86 BPM | WEIGHT: 160.8 LBS | SYSTOLIC BLOOD PRESSURE: 128 MMHG | HEIGHT: 68 IN | DIASTOLIC BLOOD PRESSURE: 80 MMHG | BODY MASS INDEX: 24.37 KG/M2

## 2025-06-02 DIAGNOSIS — K21.9 GASTROESOPHAGEAL REFLUX DISEASE WITHOUT ESOPHAGITIS: ICD-10-CM

## 2025-06-02 DIAGNOSIS — I10 BENIGN ESSENTIAL HYPERTENSION: ICD-10-CM

## 2025-06-02 DIAGNOSIS — E11.9 TYPE 2 DIABETES MELLITUS WITHOUT COMPLICATION, WITHOUT LONG-TERM CURRENT USE OF INSULIN: ICD-10-CM

## 2025-06-02 DIAGNOSIS — E11.9 DIABETES MELLITUS WITH NO COMPLICATION: ICD-10-CM

## 2025-06-02 DIAGNOSIS — E78.01 ESSENTIAL FAMILIAL HYPERCHOLESTEROLEMIA: Primary | ICD-10-CM

## 2025-06-02 LAB — POC HEMOGLOBIN A1C: 6.2 % (ref 4.2–6.5)

## 2025-06-02 PROCEDURE — 1036F TOBACCO NON-USER: CPT | Performed by: FAMILY MEDICINE

## 2025-06-02 PROCEDURE — 3079F DIAST BP 80-89 MM HG: CPT | Performed by: FAMILY MEDICINE

## 2025-06-02 PROCEDURE — 99214 OFFICE O/P EST MOD 30 MIN: CPT | Performed by: FAMILY MEDICINE

## 2025-06-02 PROCEDURE — G2211 COMPLEX E/M VISIT ADD ON: HCPCS | Performed by: FAMILY MEDICINE

## 2025-06-02 PROCEDURE — 3074F SYST BP LT 130 MM HG: CPT | Performed by: FAMILY MEDICINE

## 2025-06-02 PROCEDURE — 3044F HG A1C LEVEL LT 7.0%: CPT | Performed by: FAMILY MEDICINE

## 2025-06-02 PROCEDURE — 3008F BODY MASS INDEX DOCD: CPT | Performed by: FAMILY MEDICINE

## 2025-06-02 PROCEDURE — 83036 HEMOGLOBIN GLYCOSYLATED A1C: CPT | Performed by: FAMILY MEDICINE

## 2025-06-02 PROCEDURE — 1160F RVW MEDS BY RX/DR IN RCRD: CPT | Performed by: FAMILY MEDICINE

## 2025-06-02 PROCEDURE — 1159F MED LIST DOCD IN RCRD: CPT | Performed by: FAMILY MEDICINE

## 2025-06-02 RX ORDER — ROSUVASTATIN CALCIUM 20 MG/1
20 TABLET, COATED ORAL DAILY
Qty: 90 TABLET | Refills: 1 | Status: SHIPPED | OUTPATIENT
Start: 2025-06-02

## 2025-06-02 RX ORDER — AMLODIPINE AND BENAZEPRIL HYDROCHLORIDE 5; 20 MG/1; MG/1
1 CAPSULE ORAL DAILY
Qty: 90 CAPSULE | Refills: 0 | Status: SHIPPED | OUTPATIENT
Start: 2025-06-02 | End: 2025-11-29

## 2025-06-02 RX ORDER — GLIPIZIDE 10 MG/1
10 TABLET, FILM COATED, EXTENDED RELEASE ORAL DAILY
Qty: 90 TABLET | Refills: 1 | Status: SHIPPED | OUTPATIENT
Start: 2025-06-02 | End: 2025-11-29

## 2025-06-02 RX ORDER — IVERMECTIN 10 MG/G
CREAM TOPICAL
COMMUNITY
Start: 2025-05-15

## 2025-06-02 ASSESSMENT — ENCOUNTER SYMPTOMS
RESPIRATORY NEGATIVE: 1
CARDIOVASCULAR NEGATIVE: 1
GASTROINTESTINAL NEGATIVE: 1
OCCASIONAL FEELINGS OF UNSTEADINESS: 0
CONSTITUTIONAL NEGATIVE: 1
LOSS OF SENSATION IN FEET: 0
NEUROLOGICAL NEGATIVE: 1
MUSCULOSKELETAL NEGATIVE: 1
DEPRESSION: 0

## 2025-06-02 NOTE — PROGRESS NOTES
"Subjective   Patient ID: Denis Gonda is a 69 y.o. male who presents for No chief complaint on file..    HPI htn, cdhol, dm     Review of Systems   Constitutional: Negative.    HENT: Negative.     Respiratory: Negative.     Cardiovascular: Negative.    Gastrointestinal: Negative.    Musculoskeletal: Negative.    Neurological: Negative.        Objective   /80   Pulse 86   Ht 1.727 m (5' 8\")   Wt 72.9 kg (160 lb 12.8 oz)   BMI 24.45 kg/m²     Physical Exam  Vitals reviewed.   Constitutional:       Appearance: Normal appearance. He is normal weight.   Eyes:      Extraocular Movements: Extraocular movements intact.      Conjunctiva/sclera: Conjunctivae normal.      Pupils: Pupils are equal, round, and reactive to light.   Cardiovascular:      Rate and Rhythm: Normal rate and regular rhythm.      Pulses: Normal pulses.      Heart sounds: Normal heart sounds.   Pulmonary:      Effort: Pulmonary effort is normal.      Breath sounds: Normal breath sounds.   Abdominal:      General: Bowel sounds are normal.      Palpations: Abdomen is soft.   Musculoskeletal:         General: Normal range of motion.   Skin:     General: Skin is warm and dry.   Neurological:      General: No focal deficit present.      Mental Status: He is alert and oriented to person, place, and time. Mental status is at baseline.         Assessment/Plan   Problem List Items Addressed This Visit           ICD-10-CM    Benign essential hypertension I10    Relevant Medications    amLODIPine-benazepriL (Lotrel) 5-20 mg capsule    Diabetes mellitus with no complication E11.9    Esophageal reflux K21.9    Essential familial hypercholesterolemia - Primary E78.01    Relevant Medications    rosuvastatin (Crestor) 20 mg tablet     Other Visit Diagnoses         Codes      Type 2 diabetes mellitus without complication, without long-term current use of insulin     E11.9    Relevant Orders    POCT glycosylated hemoglobin (Hb A1C) manually resulted (Completed) "

## 2025-07-07 DIAGNOSIS — E11.9 TYPE 2 DIABETES MELLITUS WITHOUT COMPLICATION, WITHOUT LONG-TERM CURRENT USE OF INSULIN: ICD-10-CM

## 2025-07-07 DIAGNOSIS — M1A.9XX0 CHRONIC GOUT WITHOUT TOPHUS, UNSPECIFIED CAUSE, UNSPECIFIED SITE: ICD-10-CM

## 2025-07-07 DIAGNOSIS — I10 BENIGN ESSENTIAL HYPERTENSION: ICD-10-CM

## 2025-07-07 RX ORDER — PIOGLITAZONE 30 MG/1
30 TABLET ORAL DAILY
Qty: 90 TABLET | Refills: 0 | Status: SHIPPED | OUTPATIENT
Start: 2025-07-07 | End: 2026-01-03

## 2025-07-07 RX ORDER — HYDROCHLOROTHIAZIDE 25 MG/1
25 TABLET ORAL DAILY
Qty: 90 TABLET | Refills: 0 | Status: SHIPPED | OUTPATIENT
Start: 2025-07-07 | End: 2026-01-03

## 2025-07-07 RX ORDER — ALLOPURINOL 300 MG/1
300 TABLET ORAL DAILY
Qty: 90 TABLET | Refills: 0 | Status: SHIPPED | OUTPATIENT
Start: 2025-07-07

## 2025-07-25 DIAGNOSIS — E11.9 TYPE 2 DIABETES MELLITUS WITHOUT COMPLICATION, WITHOUT LONG-TERM CURRENT USE OF INSULIN: ICD-10-CM

## 2025-07-25 RX ORDER — DAPAGLIFLOZIN 10 MG/1
10 TABLET, FILM COATED ORAL DAILY
Qty: 90 TABLET | Refills: 0 | Status: SHIPPED | OUTPATIENT
Start: 2025-07-25 | End: 2026-01-21

## 2025-09-02 DIAGNOSIS — I10 BENIGN ESSENTIAL HYPERTENSION: ICD-10-CM

## 2025-09-02 RX ORDER — AMLODIPINE AND BENAZEPRIL HYDROCHLORIDE 5; 20 MG/1; MG/1
1 CAPSULE ORAL DAILY
Qty: 90 CAPSULE | Refills: 0 | Status: SHIPPED | OUTPATIENT
Start: 2025-09-02

## 2025-12-02 ENCOUNTER — APPOINTMENT (OUTPATIENT)
Dept: PRIMARY CARE | Facility: CLINIC | Age: 70
End: 2025-12-02
Payer: MEDICARE